# Patient Record
Sex: MALE | Race: OTHER | HISPANIC OR LATINO
[De-identification: names, ages, dates, MRNs, and addresses within clinical notes are randomized per-mention and may not be internally consistent; named-entity substitution may affect disease eponyms.]

---

## 2019-05-07 PROBLEM — Z00.00 ENCOUNTER FOR PREVENTIVE HEALTH EXAMINATION: Status: ACTIVE | Noted: 2019-05-07

## 2019-05-15 ENCOUNTER — APPOINTMENT (OUTPATIENT)
Dept: GASTROENTEROLOGY | Facility: CLINIC | Age: 29
End: 2019-05-15
Payer: MEDICAID

## 2019-05-15 VITALS
BODY MASS INDEX: 49.44 KG/M2 | SYSTOLIC BLOOD PRESSURE: 152 MMHG | HEIGHT: 67 IN | DIASTOLIC BLOOD PRESSURE: 109 MMHG | WEIGHT: 315 LBS | OXYGEN SATURATION: 95 % | TEMPERATURE: 98 F | HEART RATE: 98 BPM

## 2019-05-15 DIAGNOSIS — Z78.9 OTHER SPECIFIED HEALTH STATUS: ICD-10-CM

## 2019-05-15 DIAGNOSIS — R10.9 UNSPECIFIED ABDOMINAL PAIN: ICD-10-CM

## 2019-05-15 DIAGNOSIS — K92.1 MELENA: ICD-10-CM

## 2019-05-15 DIAGNOSIS — K21.9 GASTRO-ESOPHAGEAL REFLUX DISEASE W/OUT ESOPHAGITIS: ICD-10-CM

## 2019-05-15 DIAGNOSIS — E66.01 MORBID (SEVERE) OBESITY DUE TO EXCESS CALORIES: ICD-10-CM

## 2019-05-15 DIAGNOSIS — R63.4 ABNORMAL WEIGHT LOSS: ICD-10-CM

## 2019-05-15 PROCEDURE — 99203 OFFICE O/P NEW LOW 30 MIN: CPT

## 2019-05-15 NOTE — HISTORY OF PRESENT ILLNESS
[Nausea] : denies nausea [Heartburn] : stable heartburn [Constipation] : denies constipation [Vomiting] : denies vomiting [Yellow Skin Or Eyes (Jaundice)] : denies jaundice [Abdominal Swelling] : denies abdominal swelling [Rectal Pain] : denies rectal pain [Abdominal Pain] : abdominal pain [Diarrhea] : diarrhea [Wt Loss ___ Lbs] : recent [unfilled] ~Upound(s) weight loss [de-identified] : This is a 29 year old male with morbid obesity, hypertension, who presents for evaluation of abdominal pain, melena.\par \par The patient has been having abdominal pain for the last 3 months. He reports a pain in the left upper quadrant mostly, but also has a lower left sided abdominal pain that's like "a burning." The pain is not clearly associated with eating or drinking, is ~5/10 at times. He denies dysphagia or odynophagia, but reports reflux/burning, more at night. He reports a recent change in his bowel habits - realized he's going to the bathroom 2-3 times a day now for bowel movements, and the consistency of the stools are still formed but softer than normal. Intermittently he has noticed dark black stools, the last time this occurred was 1 week ago - the last few bowel movements this week were normal brown. He reports losing about 60 lb recently - he tried to eat a little healthier, but has not put in a tremendous amount of effort to lose weight. He has never had EGD or colonoscopy. He does not currently take NSAIDs. \par  \par \par Recent labs:\par \par 4/22/19:\par WBC 13, Hgb 16.8, Hct 51.2, \par \par Na 137, K 4.7, Cl 100, Bicarb 24, BUN 14, Cr 0.94, Glucose 82, Ca 10.1, T protein 8.8, Albumin 4.7, T bili 0.4, , ASt 31, ALT 21, TSH 1.65

## 2019-05-15 NOTE — ASSESSMENT
[FreeTextEntry1] : Impression:\par 1. Abdominal pain/melena with some weight loss - differential includes PUD, erosive gastropathy, gastric neoplasm, inflammatory bowel disease\par 2. Extreme morbid obesity, BMI >60\par 3. Acid reflux - likely contributed to at least partially by weight\par \par Plan:\par -Will plan for EGD and colonoscopy for evaluation of the intermittent melena and weight loss/abdominal pain. H/H is normal on recent labs. The risks and benefits of endoscopic evaluation including bleeding, infection, missed lesions, perforation, as well as anesthesia risks explained, patient is agreeable to procedure.\par -Given extremes of weight, will send for presurgical testing\par -Will give a trial of PPI daily for now for the pain/reflux, and given the intermittent melena\par -Avoid NSAIDs\par -Discussed lifestyle modifications of avoiding late night meals, head of bed elevation and avoidance of trigger foods\par \par RTC after EGD/colonoscopy

## 2019-05-15 NOTE — PHYSICAL EXAM
[General Appearance - In No Acute Distress] : in no acute distress [General Appearance - Alert] : alert [Neck Appearance] : the appearance of the neck was normal [Exaggerated Use Of Accessory Muscles For Inspiration] : no accessory muscle use [Heart Rate And Rhythm] : heart rate was normal and rhythm regular [Auscultation Breath Sounds / Voice Sounds] : lungs were clear to auscultation bilaterally [Heart Sounds] : normal S1 and S2 [Murmurs] : no murmurs [Abdomen Tenderness] : non-tender [Abdomen Soft] : soft [No Spinal Tenderness] : no spinal tenderness [Abdomen Mass (___ Cm)] : no abdominal mass palpated [FreeTextEntry1] : Extreme morbid obesity [Abnormal Walk] : normal gait [Involuntary Movements] : no involuntary movements were seen [Skin Color & Pigmentation] : normal skin color and pigmentation [] : no rash [Oriented To Time, Place, And Person] : oriented to person, place, and time [No Focal Deficits] : no focal deficits [Affect] : the affect was normal [Mood] : the mood was normal

## 2019-09-06 ENCOUNTER — OUTPATIENT (OUTPATIENT)
Dept: OUTPATIENT SERVICES | Facility: HOSPITAL | Age: 29
LOS: 1 days | End: 2019-09-06
Payer: MEDICAID

## 2019-09-06 VITALS
HEIGHT: 67 IN | SYSTOLIC BLOOD PRESSURE: 138 MMHG | DIASTOLIC BLOOD PRESSURE: 96 MMHG | HEART RATE: 74 BPM | RESPIRATION RATE: 16 BRPM | WEIGHT: 315 LBS | OXYGEN SATURATION: 98 % | TEMPERATURE: 98 F

## 2019-09-06 DIAGNOSIS — K92.1 MELENA: ICD-10-CM

## 2019-09-06 DIAGNOSIS — Z01.818 ENCOUNTER FOR OTHER PREPROCEDURAL EXAMINATION: ICD-10-CM

## 2019-09-06 DIAGNOSIS — G47.33 OBSTRUCTIVE SLEEP APNEA (ADULT) (PEDIATRIC): ICD-10-CM

## 2019-09-06 DIAGNOSIS — E66.01 MORBID (SEVERE) OBESITY DUE TO EXCESS CALORIES: ICD-10-CM

## 2019-09-06 LAB
ANION GAP SERPL CALC-SCNC: 10 MMOL/L — SIGNIFICANT CHANGE UP (ref 5–17)
BUN SERPL-MCNC: 12 MG/DL — SIGNIFICANT CHANGE UP (ref 7–23)
CALCIUM SERPL-MCNC: 9.8 MG/DL — SIGNIFICANT CHANGE UP (ref 8.4–10.5)
CHLORIDE SERPL-SCNC: 104 MMOL/L — SIGNIFICANT CHANGE UP (ref 96–108)
CO2 SERPL-SCNC: 26 MMOL/L — SIGNIFICANT CHANGE UP (ref 22–31)
CREAT SERPL-MCNC: 0.9 MG/DL — SIGNIFICANT CHANGE UP (ref 0.5–1.3)
GLUCOSE SERPL-MCNC: 90 MG/DL — SIGNIFICANT CHANGE UP (ref 70–99)
HCT VFR BLD CALC: 49.3 % — SIGNIFICANT CHANGE UP (ref 39–50)
HGB BLD-MCNC: 16 G/DL — SIGNIFICANT CHANGE UP (ref 13–17)
MCHC RBC-ENTMCNC: 29.6 PG — SIGNIFICANT CHANGE UP (ref 27–34)
MCHC RBC-ENTMCNC: 32.5 GM/DL — SIGNIFICANT CHANGE UP (ref 32–36)
MCV RBC AUTO: 91.1 FL — SIGNIFICANT CHANGE UP (ref 80–100)
PLATELET # BLD AUTO: 350 K/UL — SIGNIFICANT CHANGE UP (ref 150–400)
POTASSIUM SERPL-MCNC: 4.9 MMOL/L — SIGNIFICANT CHANGE UP (ref 3.5–5.3)
POTASSIUM SERPL-SCNC: 4.9 MMOL/L — SIGNIFICANT CHANGE UP (ref 3.5–5.3)
RBC # BLD: 5.41 M/UL — SIGNIFICANT CHANGE UP (ref 4.2–5.8)
RBC # FLD: 13.7 % — SIGNIFICANT CHANGE UP (ref 10.3–14.5)
SODIUM SERPL-SCNC: 140 MMOL/L — SIGNIFICANT CHANGE UP (ref 135–145)
WBC # BLD: 11.31 K/UL — HIGH (ref 3.8–10.5)
WBC # FLD AUTO: 11.31 K/UL — HIGH (ref 3.8–10.5)

## 2019-09-06 PROCEDURE — 85027 COMPLETE CBC AUTOMATED: CPT

## 2019-09-06 PROCEDURE — 80048 BASIC METABOLIC PNL TOTAL CA: CPT

## 2019-09-06 PROCEDURE — G0463: CPT

## 2019-09-06 NOTE — H&P PST ADULT - NSICDXPASTMEDICALHX_GEN_ALL_CORE_FT
PAST MEDICAL HISTORY:  H/O gastroesophageal reflux (GERD)     Morbid obesity     KALYAN (obstructive sleep apnea) PAST MEDICAL HISTORY:  H/O gastroesophageal reflux (GERD)     Hypertension no current med use    Morbid obesity     KALYAN (obstructive sleep apnea) as per [facility]

## 2019-09-06 NOTE — H&P PST ADULT - HISTORY OF PRESENT ILLNESS
29 year old male with PMH of Morbid obesity, KALYAN, GERD with recent complaints of  weight loss/ melena/ abdominal pain planned for EGD/ COLONOSCOPY UNDER ANESTHESIA ON 9/20/19. 29 year old male with PMH of Morbid obesity (BMI 58.4), KALYAN, GERD with recent complaints of  weight loss/ melena/ abdominal pain planned for EGD/ COLONOSCOPY UNDER ANESTHESIA ON 9/20/19.

## 2019-09-06 NOTE — H&P PST ADULT - NSICDXPROBLEM_GEN_ALL_CORE_FT
PROBLEM DIAGNOSES  Problem: Melena  Assessment and Plan: planned for EGD/ COLONOSCOPY UNDER ANESTHESIA ON 9/20/19.   pst LABS SEND  preprocedure instructions discussed     Problem: Morbid obesity  Assessment and Plan: BMI 58.4  [facility] booking notified     Problem: KALYAN (obstructive sleep apnea)  Assessment and Plan: as by [facility]  endo booking notified

## 2019-09-19 RX ORDER — POLYETHYLENE GLYCOL 3350 AND ELECTROLYTES WITH LEMON FLAVOR 236; 22.74; 6.74; 5.86; 2.97 G/4L; G/4L; G/4L; G/4L; G/4L
236 POWDER, FOR SOLUTION ORAL
Qty: 1 | Refills: 0 | Status: ACTIVE | COMMUNITY
Start: 2019-09-19 | End: 1900-01-01

## 2019-09-19 RX ORDER — SODIUM SULFATE, POTASSIUM SULFATE, MAGNESIUM SULFATE 17.5; 3.13; 1.6 G/ML; G/ML; G/ML
17.5-3.13-1.6 SOLUTION, CONCENTRATE ORAL
Qty: 1 | Refills: 0 | Status: COMPLETED | COMMUNITY
Start: 2019-05-15 | End: 2019-09-19

## 2019-09-20 ENCOUNTER — APPOINTMENT (OUTPATIENT)
Dept: GASTROENTEROLOGY | Facility: HOSPITAL | Age: 29
End: 2019-09-20

## 2019-09-20 ENCOUNTER — RESULT REVIEW (OUTPATIENT)
Age: 29
End: 2019-09-20

## 2019-09-20 ENCOUNTER — OUTPATIENT (OUTPATIENT)
Dept: OUTPATIENT SERVICES | Facility: HOSPITAL | Age: 29
LOS: 1 days | End: 2019-09-20
Payer: MEDICAID

## 2019-09-20 DIAGNOSIS — K25.9 GASTRIC ULCER, UNSPECIFIED AS ACUTE OR CHRONIC, W/OUT HEMORRHAGE OR PERFORATION: ICD-10-CM

## 2019-09-20 DIAGNOSIS — E66.01 MORBID (SEVERE) OBESITY DUE TO EXCESS CALORIES: ICD-10-CM

## 2019-09-20 PROCEDURE — 45380 COLONOSCOPY AND BIOPSY: CPT

## 2019-09-20 PROCEDURE — 43239 EGD BIOPSY SINGLE/MULTIPLE: CPT

## 2019-09-20 PROCEDURE — 88305 TISSUE EXAM BY PATHOLOGIST: CPT

## 2019-09-20 PROCEDURE — 88312 SPECIAL STAINS GROUP 1: CPT | Mod: 26

## 2019-09-20 PROCEDURE — 88305 TISSUE EXAM BY PATHOLOGIST: CPT | Mod: 26

## 2019-09-20 PROCEDURE — 88312 SPECIAL STAINS GROUP 1: CPT

## 2019-09-20 RX ORDER — OMEPRAZOLE 40 MG/1
40 CAPSULE, DELAYED RELEASE ORAL
Qty: 30 | Refills: 0 | Status: ACTIVE | COMMUNITY
Start: 2019-05-15 | End: 1900-01-01

## 2020-01-07 ENCOUNTER — TRANSCRIPTION ENCOUNTER (OUTPATIENT)
Age: 30
End: 2020-01-07

## 2020-01-15 ENCOUNTER — TRANSCRIPTION ENCOUNTER (OUTPATIENT)
Age: 30
End: 2020-01-15

## 2020-01-15 PROBLEM — I10 ESSENTIAL (PRIMARY) HYPERTENSION: Chronic | Status: ACTIVE | Noted: 2019-09-06

## 2020-01-15 PROBLEM — Z87.19 PERSONAL HISTORY OF OTHER DISEASES OF THE DIGESTIVE SYSTEM: Chronic | Status: ACTIVE | Noted: 2019-09-06

## 2020-01-15 PROBLEM — E66.01 MORBID (SEVERE) OBESITY DUE TO EXCESS CALORIES: Chronic | Status: ACTIVE | Noted: 2019-09-06

## 2020-01-15 PROBLEM — G47.33 OBSTRUCTIVE SLEEP APNEA (ADULT) (PEDIATRIC): Chronic | Status: ACTIVE | Noted: 2019-09-06

## 2020-01-24 ENCOUNTER — APPOINTMENT (OUTPATIENT)
Dept: PULMONOLOGY | Facility: CLINIC | Age: 30
End: 2020-01-24

## 2020-04-01 ENCOUNTER — OUTPATIENT (OUTPATIENT)
Dept: OUTPATIENT SERVICES | Facility: HOSPITAL | Age: 30
LOS: 1 days | End: 2020-04-01
Payer: MEDICAID

## 2020-04-01 PROCEDURE — G9001: CPT

## 2020-04-15 DIAGNOSIS — Z71.89 OTHER SPECIFIED COUNSELING: ICD-10-CM

## 2020-05-06 ENCOUNTER — APPOINTMENT (OUTPATIENT)
Dept: DERMATOLOGY | Facility: CLINIC | Age: 30
End: 2020-05-06

## 2020-11-26 ENCOUNTER — TRANSCRIPTION ENCOUNTER (OUTPATIENT)
Age: 30
End: 2020-11-26

## 2020-12-29 ENCOUNTER — TRANSCRIPTION ENCOUNTER (OUTPATIENT)
Age: 30
End: 2020-12-29

## 2021-06-18 ENCOUNTER — APPOINTMENT (OUTPATIENT)
Dept: DERMATOLOGY | Facility: CLINIC | Age: 31
End: 2021-06-18
Payer: MEDICAID

## 2021-06-18 VITALS — BODY MASS INDEX: 49.44 KG/M2 | WEIGHT: 315 LBS | HEIGHT: 67 IN

## 2021-06-18 DIAGNOSIS — L91.8 OTHER HYPERTROPHIC DISORDERS OF THE SKIN: ICD-10-CM

## 2021-06-18 DIAGNOSIS — L72.0 EPIDERMAL CYST: ICD-10-CM

## 2021-06-18 DIAGNOSIS — L30.9 DERMATITIS, UNSPECIFIED: ICD-10-CM

## 2021-06-18 DIAGNOSIS — L73.9 FOLLICULAR DISORDER, UNSPECIFIED: ICD-10-CM

## 2021-06-18 DIAGNOSIS — D23.9 OTHER BENIGN NEOPLASM OF SKIN, UNSPECIFIED: ICD-10-CM

## 2021-06-18 PROCEDURE — 99203 OFFICE O/P NEW LOW 30 MIN: CPT

## 2021-06-18 RX ORDER — CLINDAMYCIN PHOSPHATE 10 MG/ML
1 LOTION TOPICAL TWICE DAILY
Qty: 1 | Refills: 1 | Status: ACTIVE | COMMUNITY
Start: 2021-06-18 | End: 1900-01-01

## 2021-06-18 RX ORDER — TRIAMCINOLONE ACETONIDE 1 MG/G
0.1 OINTMENT TOPICAL TWICE DAILY
Qty: 1 | Refills: 1 | Status: ACTIVE | COMMUNITY
Start: 2021-06-18 | End: 1900-01-01

## 2021-06-19 PROBLEM — L72.0 MILIA: Status: ACTIVE | Noted: 2021-06-19

## 2021-06-19 PROBLEM — L30.9 DERMATITIS: Status: ACTIVE | Noted: 2021-06-18

## 2021-06-19 PROBLEM — L91.8 ACROCHORDON: Status: ACTIVE | Noted: 2021-06-19

## 2021-06-19 PROBLEM — D23.9 SYRINGOMA: Status: ACTIVE | Noted: 2021-06-19

## 2023-04-07 ENCOUNTER — APPOINTMENT (OUTPATIENT)
Dept: RADIOLOGY | Facility: CLINIC | Age: 33
End: 2023-04-07
Payer: MEDICAID

## 2023-04-07 ENCOUNTER — OUTPATIENT (OUTPATIENT)
Dept: OUTPATIENT SERVICES | Facility: HOSPITAL | Age: 33
LOS: 1 days | End: 2023-04-07
Payer: MEDICAID

## 2023-04-07 DIAGNOSIS — R05.3 CHRONIC COUGH: ICD-10-CM

## 2023-04-07 PROCEDURE — 71046 X-RAY EXAM CHEST 2 VIEWS: CPT

## 2023-04-07 PROCEDURE — 71046 X-RAY EXAM CHEST 2 VIEWS: CPT | Mod: 26

## 2023-09-21 ENCOUNTER — NON-APPOINTMENT (OUTPATIENT)
Age: 33
End: 2023-09-21

## 2024-06-13 ENCOUNTER — NON-APPOINTMENT (OUTPATIENT)
Age: 34
End: 2024-06-13